# Patient Record
Sex: MALE | Race: OTHER | Employment: FULL TIME | URBAN - METROPOLITAN AREA
[De-identification: names, ages, dates, MRNs, and addresses within clinical notes are randomized per-mention and may not be internally consistent; named-entity substitution may affect disease eponyms.]

---

## 2017-02-13 DIAGNOSIS — Z00.00 ROUTINE HEALTH MAINTENANCE: ICD-10-CM

## 2017-02-13 DIAGNOSIS — A63.0 GENITAL WARTS: ICD-10-CM

## 2017-02-13 DIAGNOSIS — Z23 NEED FOR PROPHYLACTIC VACCINATION WITH COMBINED DIPHTHERIA-TETANUS-PERTUSSIS (DTP) VACCINE: Primary | ICD-10-CM

## 2017-02-13 RX ORDER — VARENICLINE TARTRATE 1 MG/1
1 TABLET, FILM COATED ORAL 2 TIMES DAILY
Qty: 60 TABLET | Refills: 6 | Status: SHIPPED | OUTPATIENT
Start: 2017-02-13 | End: 2017-05-04 | Stop reason: ALTCHOICE

## 2017-02-13 RX ORDER — IMIQUIMOD 12.5 MG/.25G
CREAM TOPICAL
Qty: 12 EACH | Refills: 0 | Status: SHIPPED | OUTPATIENT
Start: 2017-02-13 | End: 2018-10-29

## 2017-04-25 ENCOUNTER — OFFICE VISIT (OUTPATIENT)
Dept: FAMILY MEDICINE CLINIC | Facility: CLINIC | Age: 37
End: 2017-04-25

## 2017-04-25 VITALS
WEIGHT: 229 LBS | RESPIRATION RATE: 14 BRPM | SYSTOLIC BLOOD PRESSURE: 120 MMHG | HEART RATE: 84 BPM | HEIGHT: 70.5 IN | BODY MASS INDEX: 32.42 KG/M2 | TEMPERATURE: 97 F | DIASTOLIC BLOOD PRESSURE: 78 MMHG

## 2017-04-25 DIAGNOSIS — J30.89 SEASONAL ALLERGIC RHINITIS DUE TO OTHER ALLERGIC TRIGGER: Primary | ICD-10-CM

## 2017-04-25 DIAGNOSIS — R09.81 SINUS CONGESTION: ICD-10-CM

## 2017-04-25 PROCEDURE — 99213 OFFICE O/P EST LOW 20 MIN: CPT | Performed by: FAMILY MEDICINE

## 2017-04-25 NOTE — PROGRESS NOTES
HPI:   Jessica Watkins is a 30year old male who presents for upper respiratory symptoms for  1  days. Patient reports sore throat, congestion, clear colored nasal discharge.       Current Outpatient Prescriptions:  Imiquimod 5 % External Cream Apply to the rhinitis due to other allergic trigger  (primary encounter diagnosis)  Sinus congestion    Try some flonase and or Mucinex bid call if Sx worsen or persist beyond 1 week  Meds & Refills for this Visit:  No prescriptions requested or ordered in this encount

## 2017-04-27 ENCOUNTER — TELEPHONE (OUTPATIENT)
Dept: FAMILY MEDICINE CLINIC | Facility: CLINIC | Age: 37
End: 2017-04-27

## 2017-04-27 RX ORDER — GENTAMICIN SULFATE 3 MG/ML
SOLUTION/ DROPS OPHTHALMIC
Qty: 5 ML | Refills: 0 | Status: SHIPPED | OUTPATIENT
Start: 2017-04-27 | End: 2017-05-30

## 2017-04-27 NOTE — TELEPHONE ENCOUNTER
Has had sx today, he had it a couple of months ago. He was given tobraycin 0.3% gtts. . Very red eyeball, green drainage, itchy, some swelling. Actually spouse and child were seen in [facility], both dx with \"pink eye\" and treated.  She was told if Laurence developed

## 2017-05-01 ENCOUNTER — TELEPHONE (OUTPATIENT)
Dept: FAMILY MEDICINE CLINIC | Facility: CLINIC | Age: 37
End: 2017-05-01

## 2017-05-01 NOTE — TELEPHONE ENCOUNTER
Well its possible his heartburn might be causing his sore throat and URI issues.  Have hi get some OTC omeprazole 20 mg po bid

## 2017-05-04 ENCOUNTER — OFFICE VISIT (OUTPATIENT)
Dept: FAMILY MEDICINE CLINIC | Facility: CLINIC | Age: 37
End: 2017-05-04

## 2017-05-04 VITALS
DIASTOLIC BLOOD PRESSURE: 70 MMHG | TEMPERATURE: 98 F | BODY MASS INDEX: 34 KG/M2 | WEIGHT: 238 LBS | HEART RATE: 72 BPM | SYSTOLIC BLOOD PRESSURE: 128 MMHG

## 2017-05-04 DIAGNOSIS — J01.30 ACUTE NON-RECURRENT SPHENOIDAL SINUSITIS: Primary | ICD-10-CM

## 2017-05-04 DIAGNOSIS — J02.8 PHARYNGITIS DUE TO OTHER ORGANISM: ICD-10-CM

## 2017-05-04 PROCEDURE — 99213 OFFICE O/P EST LOW 20 MIN: CPT | Performed by: FAMILY MEDICINE

## 2017-05-04 RX ORDER — CEPHALEXIN 500 MG/1
500 CAPSULE ORAL 2 TIMES DAILY
Qty: 20 CAPSULE | Refills: 0 | Status: SHIPPED | OUTPATIENT
Start: 2017-05-04 | End: 2017-05-14

## 2017-05-04 NOTE — PROGRESS NOTES
HPI:   Ajay Storm is a 30year old male who presents for upper respiratory symptoms for  2  weeks. Patient reports sore throat, congestion, yellow colored nasal discharge, sinus pain.   Has been using flonase with some relief of congestion, but has some masses, HSM or tenderness    ASSESSMENT AND PLAN:   Acute non-recurrent sphenoidal sinusitis  (primary encounter diagnosis)  Pharyngitis due to other organism  CONTINUE THE FLONASE, CAN USE SOME  MUCINEX CALL IF sX PERSIST    Meds & Refills for this Visit:

## 2017-05-30 ENCOUNTER — OFFICE VISIT (OUTPATIENT)
Dept: FAMILY MEDICINE CLINIC | Facility: CLINIC | Age: 37
End: 2017-05-30

## 2017-05-30 VITALS
BODY MASS INDEX: 33 KG/M2 | DIASTOLIC BLOOD PRESSURE: 70 MMHG | TEMPERATURE: 98 F | HEART RATE: 88 BPM | WEIGHT: 235 LBS | SYSTOLIC BLOOD PRESSURE: 124 MMHG

## 2017-05-30 DIAGNOSIS — H10.9 CONJUNCTIVITIS OF BOTH EYES, UNSPECIFIED CONJUNCTIVITIS TYPE: Primary | ICD-10-CM

## 2017-05-30 PROCEDURE — 99213 OFFICE O/P EST LOW 20 MIN: CPT | Performed by: FAMILY MEDICINE

## 2017-05-30 RX ORDER — PREDNISOLONE ACETATE 10 MG/ML
2 SUSPENSION/ DROPS OPHTHALMIC 4 TIMES DAILY
Qty: 15 ML | Refills: 0 | Status: SHIPPED | OUTPATIENT
Start: 2017-05-30 | End: 2017-06-04

## 2017-05-30 NOTE — PROGRESS NOTES
Kiran Cheng is a 30year old male.   HPI:   Derrick Lee is here for follow up on persistent eye redness and some discharge noted over the past month, his wife and child both had conjunctivitis associated with a URI, they were given eye drops to use Tobramycin, the sclera is actually edematous   NECK: supple,no adenopathy,no bruits  LUNGS: clear to auscultation  CARDIO: RRR without murmur  GI: good BS's,no masses, HSM or tenderness  EXTREMITIES: no cyanosis, clubbing or edema    ASSESSMENT AND PLAN:   Conjunctivi

## 2017-06-02 ENCOUNTER — TELEPHONE (OUTPATIENT)
Dept: FAMILY MEDICINE CLINIC | Facility: CLINIC | Age: 37
End: 2017-06-02

## 2017-06-02 NOTE — TELEPHONE ENCOUNTER
Called and left detailed message to notify patient that MD is out of office we would send message, but until then to continue drops. Notified to call back with questions. To DS  When can patient stop drops?

## 2017-06-03 NOTE — TELEPHONE ENCOUNTER
Pt's wife call to let DS know that the eyes are not as red, and much more comfortable for pt. Wife wants to know how long to keep doing the eye drops. Wife would like a call back.  [phone]

## 2017-12-12 ENCOUNTER — OFFICE VISIT (OUTPATIENT)
Dept: FAMILY MEDICINE CLINIC | Facility: CLINIC | Age: 37
End: 2017-12-12

## 2017-12-12 VITALS
TEMPERATURE: 97 F | SYSTOLIC BLOOD PRESSURE: 120 MMHG | DIASTOLIC BLOOD PRESSURE: 80 MMHG | RESPIRATION RATE: 18 BRPM | HEIGHT: 70.5 IN | HEART RATE: 88 BPM | WEIGHT: 229.81 LBS | BODY MASS INDEX: 32.53 KG/M2

## 2017-12-12 DIAGNOSIS — Z00.00 ROUTINE HEALTH MAINTENANCE: Primary | ICD-10-CM

## 2017-12-12 PROCEDURE — 80050 GENERAL HEALTH PANEL: CPT | Performed by: FAMILY MEDICINE

## 2017-12-12 PROCEDURE — 99395 PREV VISIT EST AGE 18-39: CPT | Performed by: FAMILY MEDICINE

## 2017-12-12 PROCEDURE — 80061 LIPID PANEL: CPT | Performed by: FAMILY MEDICINE

## 2017-12-12 PROCEDURE — 36415 COLL VENOUS BLD VENIPUNCTURE: CPT | Performed by: FAMILY MEDICINE

## 2017-12-12 NOTE — PROGRESS NOTES
Moise Lazaro is a 30year old male who presents for a complete physical exam.   HPI:   Pt complains of has occasional GERD takes OTC omeprazole.  But not daily and  Does not wake him up at night, due for labs      Immunization History  Administered Occ: . : . Children: 1.5. Exercise: none.   Diet: watches calories closely     REVIEW OF SYSTEMS:   GENERAL: feels well otherwise  SKIN: denies any unusual skin lesions  EYES:denies blurred vision or double vision  HEENT: denies these issues and agrees to the plan. The patient is asked to return for CPX in 1 year.   Routine health maintenance  (primary encounter diagnosis)      Orders Placed This Encounter      CBC, Platelet, No Differential [E]      Comp Metabolic Panel (14) [E]

## 2017-12-13 ENCOUNTER — TELEPHONE (OUTPATIENT)
Dept: FAMILY MEDICINE CLINIC | Facility: CLINIC | Age: 37
End: 2017-12-13

## 2017-12-13 NOTE — TELEPHONE ENCOUNTER
----- Message from Sera Quezada DO sent at 12/13/2017  8:21 AM CST -----  Notify Grewal Player  labs looked very good lipids were  Excellent kidney, liver function, blood sugar and thyroid were all normal.  He's good for a year

## 2018-10-29 ENCOUNTER — NURSE ONLY (OUTPATIENT)
Dept: FAMILY MEDICINE CLINIC | Facility: CLINIC | Age: 38
End: 2018-10-29
Payer: COMMERCIAL

## 2018-10-29 ENCOUNTER — OFFICE VISIT (OUTPATIENT)
Dept: FAMILY MEDICINE CLINIC | Facility: CLINIC | Age: 38
End: 2018-10-29
Payer: COMMERCIAL

## 2018-10-29 ENCOUNTER — TELEPHONE (OUTPATIENT)
Dept: FAMILY MEDICINE CLINIC | Facility: CLINIC | Age: 38
End: 2018-10-29

## 2018-10-29 VITALS
TEMPERATURE: 99 F | RESPIRATION RATE: 16 BRPM | WEIGHT: 224.81 LBS | HEART RATE: 100 BPM | DIASTOLIC BLOOD PRESSURE: 80 MMHG | SYSTOLIC BLOOD PRESSURE: 110 MMHG | BODY MASS INDEX: 31.47 KG/M2 | HEIGHT: 71 IN

## 2018-10-29 DIAGNOSIS — Z00.00 ROUTINE HEALTH MAINTENANCE: Primary | ICD-10-CM

## 2018-10-29 DIAGNOSIS — Z00.00 ROUTINE HEALTH MAINTENANCE: ICD-10-CM

## 2018-10-29 PROCEDURE — 84439 ASSAY OF FREE THYROXINE: CPT | Performed by: FAMILY MEDICINE

## 2018-10-29 PROCEDURE — 80061 LIPID PANEL: CPT | Performed by: FAMILY MEDICINE

## 2018-10-29 PROCEDURE — 99395 PREV VISIT EST AGE 18-39: CPT | Performed by: FAMILY MEDICINE

## 2018-10-29 PROCEDURE — 36415 COLL VENOUS BLD VENIPUNCTURE: CPT | Performed by: FAMILY MEDICINE

## 2018-10-29 PROCEDURE — 80050 GENERAL HEALTH PANEL: CPT | Performed by: FAMILY MEDICINE

## 2018-10-29 NOTE — PROGRESS NOTES
1 mint and 1 lavender tube collected from L AC using straight needle and 1 attempt    Pt tolerated and was sent home in stable condition

## 2018-10-29 NOTE — TELEPHONE ENCOUNTER
Would like to come in around 3:30 for labs before 5:15 appt tonight so he can eat. Need for wellness visit and the same labs wife had. Pls call [facility].

## 2018-10-29 NOTE — TELEPHONE ENCOUNTER
Routing to provider for reivew-   Last Labs  Lipid, TSH, CMP and CBC 12/12/17  Last Lipid 11/2015    Please advise what orders RN can place and I will call pt back to schedule nurse visit for labs

## 2018-10-29 NOTE — PROGRESS NOTES
Maria Del Carmen Velez is a 41year old male who presents for a complete physical exam.   HPI:   Pt complains of nothing at this time. He has some occasional heartburn but for the most it has been pretty good.  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 Exercise: none.   Diet: doesn't watch     REVIEW OF SYSTEMS:   GENERAL: feels well other times weekly. Health maintenance, will check fasting Lipids, CMP, CBC and TSH. Pt info handouts given for: exercise, low fat diet, testicular self exam and prostate cancer screening.  The patient indicates understanding of these issues and agrees to the pl

## 2018-10-30 ENCOUNTER — TELEPHONE (OUTPATIENT)
Dept: FAMILY MEDICINE CLINIC | Facility: CLINIC | Age: 38
End: 2018-10-30

## 2018-10-30 NOTE — TELEPHONE ENCOUNTER
----- Message from Lisa Garcia DO sent at 10/30/2018  5:26 AM CDT -----  Can notify Hernan Lloyds his labs look very good, BS, kidney liver, thyroid, and blood count all look good.  Just need to work on his weight , maybe lose about 10 pounds and he’s good, let’s

## 2019-09-17 ENCOUNTER — TELEPHONE (OUTPATIENT)
Dept: FAMILY MEDICINE CLINIC | Facility: CLINIC | Age: 39
End: 2019-09-17

## 2019-09-17 DIAGNOSIS — Z00.00 ROUTINE HEALTH MAINTENANCE: Primary | ICD-10-CM

## 2019-09-17 NOTE — TELEPHONE ENCOUNTER
Does not have order, wife also calling about herself she is a patient of Dr. Binu Jean.  Patient currently has an apt set up for 10/12

## 2019-09-17 NOTE — TELEPHONE ENCOUNTER
Patient and wife need to have lab work for their health insurance. They need to have a [id number]; Cholesterol Lipid panel and either a [place] [address] [id number] Comp/Metabolic.

## 2019-10-12 ENCOUNTER — NURSE ONLY (OUTPATIENT)
Dept: FAMILY MEDICINE CLINIC | Facility: CLINIC | Age: 39
End: 2019-10-12
Payer: COMMERCIAL

## 2019-10-12 DIAGNOSIS — Z00.00 ROUTINE HEALTH MAINTENANCE: ICD-10-CM

## 2019-10-12 PROCEDURE — 85025 COMPLETE CBC W/AUTO DIFF WBC: CPT | Performed by: FAMILY MEDICINE

## 2019-10-12 PROCEDURE — 36415 COLL VENOUS BLD VENIPUNCTURE: CPT | Performed by: FAMILY MEDICINE

## 2019-10-12 PROCEDURE — 80053 COMPREHEN METABOLIC PANEL: CPT | Performed by: FAMILY MEDICINE

## 2019-10-12 PROCEDURE — 80061 LIPID PANEL: CPT | Performed by: FAMILY MEDICINE

## 2019-10-12 NOTE — PROGRESS NOTES
Patient presented for lab work ordered by Jose Hughes. One mint tube and one lavender tube drawn with a straight needle in right arm X 1 attempt. PT left form for work px to be filled out by Dr. Mateusz Gandara. Pt tolerated procedure well.

## 2019-10-14 ENCOUNTER — TELEPHONE (OUTPATIENT)
Dept: FAMILY MEDICINE CLINIC | Facility: CLINIC | Age: 39
End: 2019-10-14

## 2019-11-06 ENCOUNTER — OFFICE VISIT (OUTPATIENT)
Dept: FAMILY MEDICINE CLINIC | Facility: CLINIC | Age: 39
End: 2019-11-06
Payer: COMMERCIAL

## 2019-11-06 VITALS
TEMPERATURE: 99 F | RESPIRATION RATE: 18 BRPM | BODY MASS INDEX: 30.75 KG/M2 | HEIGHT: 71 IN | DIASTOLIC BLOOD PRESSURE: 70 MMHG | HEART RATE: 84 BPM | SYSTOLIC BLOOD PRESSURE: 112 MMHG | WEIGHT: 219.63 LBS

## 2019-11-06 DIAGNOSIS — Z00.00 ROUTINE HEALTH MAINTENANCE: Primary | ICD-10-CM

## 2019-11-06 PROCEDURE — 99395 PREV VISIT EST AGE 18-39: CPT | Performed by: FAMILY MEDICINE

## 2019-11-06 NOTE — PROGRESS NOTES
Paulo Payne is a 42year old male who presents for a complete physical exam.   HPI:   Pt complains of nothing at this time  No recent surgery no new Rx meds or OTC meds, in general he feels well. No family HX, of anything significant.  His brother was [facility] 1.00        Years: 18.00        Pack years: 25        Quit date: 3/13/2017        Years since quittin.6      Smokeless tobacco: Never Used    Alcohol use:  Yes      Alcohol/week: 0.0 standard drinks      Frequency: 2-3 times a week    Drug use: No     O Pt's weight is Body mass index is 30.63 kg/m². , recommended low fat diet and aerobic exercise 30 minutes three times weekly.  Health maintenance, will reveiwed fasting Lipids, CMP, CBC  info handouts given for: exercise, low fat diet, testicular self exam T

## 2019-12-30 ENCOUNTER — OFFICE VISIT (OUTPATIENT)
Dept: FAMILY MEDICINE CLINIC | Facility: CLINIC | Age: 39
End: 2019-12-30
Payer: COMMERCIAL

## 2019-12-30 ENCOUNTER — TELEPHONE (OUTPATIENT)
Dept: FAMILY MEDICINE CLINIC | Facility: CLINIC | Age: 39
End: 2019-12-30

## 2019-12-30 VITALS
BODY MASS INDEX: 30 KG/M2 | RESPIRATION RATE: 20 BRPM | SYSTOLIC BLOOD PRESSURE: 112 MMHG | TEMPERATURE: 97 F | DIASTOLIC BLOOD PRESSURE: 68 MMHG | WEIGHT: 215.63 LBS | HEART RATE: 72 BPM

## 2019-12-30 DIAGNOSIS — L73.8 FOLLICULITIS BARBAE: Primary | ICD-10-CM

## 2019-12-30 PROCEDURE — 99214 OFFICE O/P EST MOD 30 MIN: CPT | Performed by: FAMILY MEDICINE

## 2019-12-30 RX ORDER — METHYLPREDNISOLONE 4 MG/1
TABLET ORAL
Qty: 1 KIT | Refills: 0 | Status: SHIPPED | OUTPATIENT
Start: 2019-12-30 | End: 2019-12-30

## 2019-12-30 RX ORDER — METHYLPREDNISOLONE 4 MG/1
TABLET ORAL
Qty: 1 KIT | Refills: 0 | Status: SHIPPED | OUTPATIENT
Start: 2019-12-30 | End: 2020-10-10 | Stop reason: ALTCHOICE

## 2019-12-30 NOTE — PROGRESS NOTES
Prince Hastings is a 42year old male. HPI:   Reji Douglass developed a rash on his hands and feet after his son had HFM disease, he noted red blotches on his fingers and the palms and the bottoms of  his feet, he also noted a few on the back of his hands.  He also tenderness  EXTREMITIES: no cyanosis, clubbing or edema    ASSESSMENT AND PLAN:     Folliculitis barbae  (primary encounter diagnosis)    reviewed course and etiology of  HFM and should eb fine martin a week was papo hand out,  continue the T gel for now reg

## 2019-12-30 NOTE — TELEPHONE ENCOUNTER
Per DS can see pt today at 3:30    LM for wife that DS can see him at 330- pt is on schedule.   Wife was advised to call back if that does not work    Future Appointments   Date Time Provider Cindy Riggins   12/30/2019  3:30 PM Coleen Young, DO Cindy Enciso

## 2019-12-30 NOTE — TELEPHONE ENCOUNTER
Wife called kids had HFM. Now Pt has sores on top of hands, head/hair, and on feet( cant put his boots on and walking on toes). They are wondering if Pt now has HFM. Wants to know if they could get in today or if they should go to walk in care?  Please advi

## 2020-03-16 PROBLEM — Z30.2 ENCOUNTER FOR VASECTOMY: Status: ACTIVE | Noted: 2020-03-16

## 2020-10-10 ENCOUNTER — OFFICE VISIT (OUTPATIENT)
Dept: FAMILY MEDICINE CLINIC | Facility: CLINIC | Age: 40
End: 2020-10-10
Payer: COMMERCIAL

## 2020-10-10 VITALS
SYSTOLIC BLOOD PRESSURE: 110 MMHG | HEART RATE: 72 BPM | TEMPERATURE: 98 F | HEIGHT: 71 IN | RESPIRATION RATE: 16 BRPM | DIASTOLIC BLOOD PRESSURE: 70 MMHG | WEIGHT: 205.19 LBS | BODY MASS INDEX: 28.73 KG/M2

## 2020-10-10 DIAGNOSIS — Z00.00 ROUTINE HEALTH MAINTENANCE: Primary | ICD-10-CM

## 2020-10-10 PROCEDURE — 3074F SYST BP LT 130 MM HG: CPT | Performed by: FAMILY MEDICINE

## 2020-10-10 PROCEDURE — 80050 GENERAL HEALTH PANEL: CPT | Performed by: FAMILY MEDICINE

## 2020-10-10 PROCEDURE — 3008F BODY MASS INDEX DOCD: CPT | Performed by: FAMILY MEDICINE

## 2020-10-10 PROCEDURE — 36415 COLL VENOUS BLD VENIPUNCTURE: CPT | Performed by: FAMILY MEDICINE

## 2020-10-10 PROCEDURE — 80061 LIPID PANEL: CPT | Performed by: FAMILY MEDICINE

## 2020-10-10 PROCEDURE — 3078F DIAST BP <80 MM HG: CPT | Performed by: FAMILY MEDICINE

## 2020-10-10 PROCEDURE — 99395 PREV VISIT EST AGE 18-39: CPT | Performed by: FAMILY MEDICINE

## 2020-10-10 NOTE — PROGRESS NOTES
Raymond Gonzalez is a 47year old male who presents for a complete physical exam.   HPI:   Pt complains of nothing at this time he, he has lost about 20 pounds intentionally, he feels great, has not had any surgery since he was last here, no new RX meds ,, t Smoker        Packs/day: 1.00        Years: 18.00        Pack years: 25        Quit date: 3/13/2017        Years since quitting: 3.5      Smokeless tobacco: Never Used    Alcohol use:  Yes      Alcohol/week: 0.0 standard drinks      Frequency: 2-3 times a w sensory are grossly intact    ASSESSMENT AND PLAN:   Roland Carter is a 47year old male who presents for a complete physical exam.    Pt's weight is Body mass index is 28.62 kg/m². , recommended low fat diet and aerobic exercise 30 minutes three times wee

## 2020-10-12 ENCOUNTER — TELEPHONE (OUTPATIENT)
Dept: FAMILY MEDICINE CLINIC | Facility: CLINIC | Age: 40
End: 2020-10-12

## 2020-10-12 NOTE — TELEPHONE ENCOUNTER
----- Message from Levi Marcelino DO sent at 10/12/2020  7:55 AM CDT -----  Notify Maria Del Carmen Velez  labs looked very good lipids were  Excellent kidney, liver function, blood sugar and thyroid were all normal.  He's good for a year

## 2021-03-16 ENCOUNTER — TELEPHONE (OUTPATIENT)
Dept: FAMILY MEDICINE CLINIC | Facility: CLINIC | Age: 41
End: 2021-03-16

## 2021-03-16 ENCOUNTER — LAB ENCOUNTER (OUTPATIENT)
Dept: LAB | Facility: HOSPITAL | Age: 41
End: 2021-03-16
Attending: FAMILY MEDICINE
Payer: COMMERCIAL

## 2021-03-16 DIAGNOSIS — R50.81 FEVER IN OTHER DISEASES: Primary | ICD-10-CM

## 2021-03-16 DIAGNOSIS — Z20.822 CLOSE EXPOSURE TO COVID-19 VIRUS: ICD-10-CM

## 2021-03-16 DIAGNOSIS — R50.81 FEVER IN OTHER DISEASES: ICD-10-CM

## 2021-03-16 NOTE — TELEPHONE ENCOUNTER
[facility] has been having sx, headache, body aches, vomiting x 1, no fever, both of the sons c/o abdominal sx. Dr. Jacky Saldana ordered covid test for those 3. Alex Sarabia wonders if Michela Zeferino should be tested also, he has started to develop, head/sinus sx.

## 2021-03-17 ENCOUNTER — TELEPHONE (OUTPATIENT)
Dept: FAMILY MEDICINE CLINIC | Facility: CLINIC | Age: 41
End: 2021-03-17

## 2021-03-17 LAB — SARS-COV-2 RNA RESP QL NAA+PROBE: NOT DETECTED

## 2021-03-17 NOTE — TELEPHONE ENCOUNTER
----- Message from Kami Phillips DO sent at 3/17/2021  4:10 PM CDT -----  Can notify Beny Hughes his COVID test was negative

## 2021-04-28 NOTE — TELEPHONE ENCOUNTER
----- Message from Donald Simon DO sent at 10/14/2019 12:51 PM CDT -----  Notify Tremaine Pretty  labs looked very good lipids were  Excellent kidney, liver function, blood sugar and thyroid were all normal.  We can discuss further at his Physical, if need
Mary Abad (consent in Media Tab) notified of results. Also informed that form for work has been faxed.
18

## 2021-05-02 ENCOUNTER — IMMUNIZATION (OUTPATIENT)
Dept: LAB | Age: 41
End: 2021-05-02
Attending: HOSPITALIST
Payer: COMMERCIAL

## 2021-05-02 DIAGNOSIS — Z23 NEED FOR VACCINATION: Primary | ICD-10-CM

## 2021-05-02 PROCEDURE — 0001A SARSCOV2 VAC 30MCG/0.3ML IM: CPT

## 2021-05-03 ENCOUNTER — HOSPITAL ENCOUNTER (OUTPATIENT)
Age: 41
Discharge: HOME | End: 2021-05-03
Payer: COMMERCIAL

## 2021-05-03 ENCOUNTER — TELEPHONE (OUTPATIENT)
Dept: FAMILY MEDICINE CLINIC | Facility: CLINIC | Age: 41
End: 2021-05-03

## 2021-05-03 ENCOUNTER — APPOINTMENT (OUTPATIENT)
Dept: GENERAL RADIOLOGY | Age: 41
End: 2021-05-03
Attending: PHYSICIAN ASSISTANT
Payer: COMMERCIAL

## 2021-05-03 VITALS
HEART RATE: 82 BPM | WEIGHT: 206 LBS | RESPIRATION RATE: 18 BRPM | SYSTOLIC BLOOD PRESSURE: 128 MMHG | TEMPERATURE: 98 F | DIASTOLIC BLOOD PRESSURE: 81 MMHG | OXYGEN SATURATION: 97 % | BODY MASS INDEX: 27.9 KG/M2 | HEIGHT: 72 IN

## 2021-05-03 DIAGNOSIS — Z72.0 TOBACCO ABUSE: ICD-10-CM

## 2021-05-03 DIAGNOSIS — R05.9 COUGH: Primary | ICD-10-CM

## 2021-05-03 DIAGNOSIS — R05.3 CHRONIC COUGH: Primary | ICD-10-CM

## 2021-05-03 PROCEDURE — 99202 OFFICE O/P NEW SF 15 MIN: CPT | Performed by: PHYSICIAN ASSISTANT

## 2021-05-03 PROCEDURE — 71046 X-RAY EXAM CHEST 2 VIEWS: CPT | Performed by: PHYSICIAN ASSISTANT

## 2021-05-03 NOTE — TELEPHONE ENCOUNTER
I'm betting he has reflux and is refluxing overnight, or he might be having some bronchospasm, like an asthma thing and not COVID or cancer.  I can order a CXR he can get done after work, at the [address] [place] [address] and then set up an appt the next day to go byron the results

## 2021-05-03 NOTE — TELEPHONE ENCOUNTER
Pt scheduled visit in office via [address] include coughing and SOB    RN to call and triage- may need VV

## 2021-05-03 NOTE — ED PROVIDER NOTES
Patient Seen in: [facility]      History   Patient presents with:  Shortness Of Breath    Stated Complaint: wheezing and coughing at night    HPI/Subjective:   HPI    30-year-old male with a smoking history presents to the IC for evaluation of External ear normal.      Left Ear: External ear normal.   Eyes:      Conjunctiva/sclera: Conjunctivae normal.   Cardiovascular:      Rate and Rhythm: Normal rate and regular rhythm.    Pulmonary:      Effort: Pulmonary effort is normal.      Breath sounds: understanding and agreement to discharge plan.                              Disposition and Plan     Clinical Impression:  Chronic cough  (primary encounter diagnosis)     Disposition:  Discharge  5/3/2021  4:40 pm    Follow-up:  Juani Crain DO  [facility]

## 2021-05-03 NOTE — TELEPHONE ENCOUNTER
Wife states pt is smoker and this cough has been going on mostly at night. He wakes up and feels like there if fluid in his chest and he can't breath. Wife states this has been going on for about the past 2 months.     Wife states pt has been having s

## 2021-05-03 NOTE — ED INITIAL ASSESSMENT (HPI)
Pt c/o sob and wheezing intermittently over the past couple of months,only at nighttime while laying down. Pt reports he also has a cough with clear mucous at nighttime only. Pt denies any SOB, CP, fever, chills, or any symptoms at this time.  Pt is a smoke

## 2021-05-03 NOTE — TELEPHONE ENCOUNTER
Wife was advised of o rder in system and was given number to CS to call and schedule CXR    Will call back to make f/u visit    Visit on 5/3/21 cancelled

## 2021-05-06 ENCOUNTER — OFFICE VISIT (OUTPATIENT)
Dept: FAMILY MEDICINE CLINIC | Facility: CLINIC | Age: 41
End: 2021-05-06
Payer: COMMERCIAL

## 2021-05-06 VITALS
OXYGEN SATURATION: 98 % | TEMPERATURE: 98 F | HEART RATE: 73 BPM | WEIGHT: 212.81 LBS | SYSTOLIC BLOOD PRESSURE: 112 MMHG | DIASTOLIC BLOOD PRESSURE: 70 MMHG | BODY MASS INDEX: 29 KG/M2 | RESPIRATION RATE: 20 BRPM

## 2021-05-06 DIAGNOSIS — R05.9 COUGH: Primary | ICD-10-CM

## 2021-05-06 DIAGNOSIS — Z72.0 TOBACCO ABUSE: ICD-10-CM

## 2021-05-06 DIAGNOSIS — J98.01 BRONCHOSPASM: ICD-10-CM

## 2021-05-06 DIAGNOSIS — K21.00 GASTROESOPHAGEAL REFLUX DISEASE WITH ESOPHAGITIS WITHOUT HEMORRHAGE: ICD-10-CM

## 2021-05-06 PROCEDURE — 3078F DIAST BP <80 MM HG: CPT | Performed by: FAMILY MEDICINE

## 2021-05-06 PROCEDURE — 3074F SYST BP LT 130 MM HG: CPT | Performed by: FAMILY MEDICINE

## 2021-05-06 PROCEDURE — 99214 OFFICE O/P EST MOD 30 MIN: CPT | Performed by: FAMILY MEDICINE

## 2021-05-06 RX ORDER — NICOTINE POLACRILEX 4 MG/1
20 GUM, CHEWING ORAL NIGHTLY
Qty: 30 TABLET | Refills: 2 | Status: SHIPPED | OUTPATIENT
Start: 2021-05-06 | End: 2021-05-27

## 2021-05-06 RX ORDER — METHYLPREDNISOLONE 4 MG/1
TABLET ORAL
Qty: 1 KIT | Refills: 0 | Status: SHIPPED | OUTPATIENT
Start: 2021-05-06 | End: 2021-05-27 | Stop reason: ALTCHOICE

## 2021-05-06 NOTE — PROGRESS NOTES
Sonam Anna is a 46year old male. HPI:   Pennie Alves is here for follow up on cough, he is a smoker, and he does get GERD at night, he wakes himself up at times, and coughs and wheezes, non productive.  He is afebrile, if he coughs anything up it is clear, he murmur  GI: good BS's,no masses, HSM or tenderness  EXTREMITIES: no cyanosis, clubbing or edema    ASSESSMENT AND PLAN:     Cough  (primary encounter diagnosis)  Tobacco abuse  Gastroesophageal reflux disease with esophagitis without hemorrhage  Bronchospa

## 2021-05-23 ENCOUNTER — IMMUNIZATION (OUTPATIENT)
Dept: LAB | Age: 41
End: 2021-05-23
Attending: HOSPITALIST
Payer: COMMERCIAL

## 2021-05-23 DIAGNOSIS — Z23 NEED FOR VACCINATION: Primary | ICD-10-CM

## 2021-05-23 PROCEDURE — 0002A SARSCOV2 VAC 30MCG/0.3ML IM: CPT

## 2021-05-27 ENCOUNTER — OFFICE VISIT (OUTPATIENT)
Dept: FAMILY MEDICINE CLINIC | Facility: CLINIC | Age: 41
End: 2021-05-27
Payer: COMMERCIAL

## 2021-05-27 ENCOUNTER — OFFICE VISIT (OUTPATIENT)
Dept: FAMILY MEDICINE CLINIC | Facility: CLINIC | Age: 41
End: 2021-05-27

## 2021-05-27 VITALS
BODY MASS INDEX: 28.31 KG/M2 | DIASTOLIC BLOOD PRESSURE: 72 MMHG | HEART RATE: 72 BPM | WEIGHT: 209 LBS | HEIGHT: 72 IN | SYSTOLIC BLOOD PRESSURE: 110 MMHG | RESPIRATION RATE: 16 BRPM

## 2021-05-27 VITALS
RESPIRATION RATE: 20 BRPM | WEIGHT: 209.63 LBS | TEMPERATURE: 98 F | HEIGHT: 72 IN | HEART RATE: 72 BPM | BODY MASS INDEX: 28.39 KG/M2 | DIASTOLIC BLOOD PRESSURE: 72 MMHG | SYSTOLIC BLOOD PRESSURE: 110 MMHG

## 2021-05-27 DIAGNOSIS — Z02.4 DRIVER'S PERMIT PE (PHYSICAL EXAMINATION): Primary | ICD-10-CM

## 2021-05-27 DIAGNOSIS — K21.00 GASTROESOPHAGEAL REFLUX DISEASE WITH ESOPHAGITIS WITHOUT HEMORRHAGE: Primary | ICD-10-CM

## 2021-05-27 DIAGNOSIS — Z72.0 TOBACCO ABUSE: ICD-10-CM

## 2021-05-27 DIAGNOSIS — R06.00 DYSPNEA ON EXERTION: ICD-10-CM

## 2021-05-27 PROCEDURE — 81003 URINALYSIS AUTO W/O SCOPE: CPT | Performed by: FAMILY MEDICINE

## 2021-05-27 PROCEDURE — 99214 OFFICE O/P EST MOD 30 MIN: CPT | Performed by: FAMILY MEDICINE

## 2021-05-27 RX ORDER — NICOTINE POLACRILEX 4 MG/1
40 GUM, CHEWING ORAL NIGHTLY
Qty: 30 TABLET | Refills: 2 | COMMUNITY
Start: 2021-05-27

## 2021-05-27 NOTE — PROGRESS NOTES
Marcello Hester is a 46year old male. HPI:   Bobbi Claros is here for follow up on cough, he is a smoker, and he does get GERD at night, he wakes himself up at times, and coughs and wheezes, non productive.  He is afebrile, if he coughs anything up it is clear, he his throat, and coughing  NEURO: denies headaches    EXAM:   /72 (BP Location: Left arm, Patient Position: Sitting, Cuff Size: large)   Pulse 72   Temp 97.9 °F (36.6 °C) (Temporal)   Resp 20   Ht 6' (1.829 m)   Wt 209 lb 9.6 oz (95.1 kg)   BMI 28.43

## 2021-05-27 NOTE — PROGRESS NOTES
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    NEURO: Alert and oriented to person place and time  GENITAL EXA

## 2021-12-14 ENCOUNTER — OFFICE VISIT (OUTPATIENT)
Dept: FAMILY MEDICINE CLINIC | Facility: CLINIC | Age: 41
End: 2021-12-14
Payer: COMMERCIAL

## 2021-12-14 VITALS
DIASTOLIC BLOOD PRESSURE: 80 MMHG | HEIGHT: 72 IN | BODY MASS INDEX: 30.45 KG/M2 | SYSTOLIC BLOOD PRESSURE: 120 MMHG | OXYGEN SATURATION: 98 % | HEART RATE: 83 BPM | WEIGHT: 224.81 LBS | TEMPERATURE: 97 F

## 2021-12-14 DIAGNOSIS — Z00.00 ROUTINE HEALTH MAINTENANCE: Primary | ICD-10-CM

## 2021-12-14 DIAGNOSIS — A63.0 GENITAL WARTS: ICD-10-CM

## 2021-12-14 PROCEDURE — 80061 LIPID PANEL: CPT | Performed by: FAMILY MEDICINE

## 2021-12-14 PROCEDURE — 3008F BODY MASS INDEX DOCD: CPT | Performed by: FAMILY MEDICINE

## 2021-12-14 PROCEDURE — 80050 GENERAL HEALTH PANEL: CPT | Performed by: FAMILY MEDICINE

## 2021-12-14 PROCEDURE — 3074F SYST BP LT 130 MM HG: CPT | Performed by: FAMILY MEDICINE

## 2021-12-14 PROCEDURE — 99395 PREV VISIT EST AGE 18-39: CPT | Performed by: FAMILY MEDICINE

## 2021-12-14 PROCEDURE — 3079F DIAST BP 80-89 MM HG: CPT | Performed by: FAMILY MEDICINE

## 2021-12-14 NOTE — PROGRESS NOTES
Moise Lazaro is a 46year old male who presents for a complete physical exam.   HPI:   Pt complains of having issues with genital warts and not treating them completely he was given a topical antiviral and did not use it necessarily as directed.  He has a Diagnosis Date   • Tobacco abuse       Past Surgical History:   Procedure Laterality Date   • APPENDECTOMY     • APPENDECTOMY     • VASECTOMY  03/12/2020    Dr. Prieto Godoy      Family History   Problem Relation Age of Onset   • Other (Other) Brother         d BS's,no masses, HSM or tenderness  : two descended testes,no masses,no hernia, has a number of warts noted  The largest just above his penis  MUSCULOSKELETAL: back is not tender,FROM of the back  EXTREMITIES: no cyanosis, clubbing or edema  NEURO: [place]

## 2021-12-17 ENCOUNTER — TELEPHONE (OUTPATIENT)
Dept: FAMILY MEDICINE CLINIC | Facility: CLINIC | Age: 41
End: 2021-12-17

## 2021-12-17 NOTE — TELEPHONE ENCOUNTER
----- Message from Ani Enriquez DO sent at 12/17/2021 12:08 PM CST -----  Notify Cy Dopp  labs looked very good lipids were  Excellent,   kidney, liver function, and thyroid were all normal. His BS was slightly elevated but if I remember right this

## 2022-01-04 ENCOUNTER — TELEPHONE (OUTPATIENT)
Dept: FAMILY MEDICINE CLINIC | Facility: CLINIC | Age: 42
End: 2022-01-04

## 2022-01-04 NOTE — TELEPHONE ENCOUNTER
Pt's spouse called he is covid positive and has a very sore throat. He has been taking Tylenol and Mucinex sore throat/pain reliever . Spouse is wanting to know if he could get a secondary infections and if he should be on a different medication?     If christopher

## 2022-01-04 NOTE — TELEPHONE ENCOUNTER
Wife states pt tested positive on Sunday    Pt major complaint now is the Sore throat- wife states he has been trying to manage with tylenol and throat spray    Pt has lots of PND    Wondering if he need a different medication to manage ST?   Should he get

## 2022-01-04 NOTE — TELEPHONE ENCOUNTER
So It's more than likely not strep, it's a very common presentation with covid. He can take ADVIL 600 mg every eight and it should resolve in another day or so.  Unless there is pus back there, but I'd be surpised

## 2022-09-13 ENCOUNTER — TELEPHONE (OUTPATIENT)
Dept: FAMILY MEDICINE CLINIC | Facility: CLINIC | Age: 42
End: 2022-09-13

## 2022-09-13 NOTE — TELEPHONE ENCOUNTER
FYI:    Future Appointments   Date Time Provider Cindy Riggins   10/15/2022  8:30 AM Lety Garcias, DO [facility]       PER SPOUSE WOULD LIKE TO MAKE SURE THAT DR DOES RECTAL EXAM - PT HAS HAD A SOME BLOOD IN THE STOOL.       Mickey Fenton

## 2023-01-11 ENCOUNTER — TELEPHONE (OUTPATIENT)
Dept: FAMILY MEDICINE CLINIC | Facility: CLINIC | Age: 43
End: 2023-01-11

## 2023-01-11 RX ORDER — AMOXICILLIN AND CLAVULANATE POTASSIUM 875; 125 MG/1; MG/1
1 TABLET, FILM COATED ORAL 2 TIMES DAILY
Qty: 20 TABLET | Refills: 0 | Status: SHIPPED | OUTPATIENT
Start: 2023-01-11 | End: 2023-01-21

## 2023-01-11 NOTE — TELEPHONE ENCOUNTER
[id number] - [place] - [address], [phone]   [address] [place] [address]   Phone: [phone] Fax: [phone]   Hours: Not open 24 hours     PATIENT SPOUSE CALLING. SHE SAYS PATIENT HAS SINUSITIS. SPOUSE AND KIDS HAD THE SAME SYMPTOMS AND WENT TO [facility] AND WERE GIVEN ANTIBIOTIC. SPOUSE ASKING IF DR FRAZIER CAN CALL IN AN ANTIBIOTIC FOR PATIENT.

## 2023-01-11 NOTE — TELEPHONE ENCOUNTER
Spoke with patient, sinus congestion/teeth pressure x 3 days. Had the same symptoms around [place]. Would like abx sent in to pharmacy.     Please advise thank you

## 2023-09-16 ENCOUNTER — OFFICE VISIT (OUTPATIENT)
Dept: FAMILY MEDICINE CLINIC | Facility: CLINIC | Age: 43
End: 2023-09-16
Payer: COMMERCIAL

## 2023-09-16 VITALS
SYSTOLIC BLOOD PRESSURE: 116 MMHG | HEART RATE: 74 BPM | OXYGEN SATURATION: 97 % | HEIGHT: 72 IN | DIASTOLIC BLOOD PRESSURE: 74 MMHG | BODY MASS INDEX: 31.69 KG/M2 | TEMPERATURE: 98 F | WEIGHT: 234 LBS

## 2023-09-16 DIAGNOSIS — Z00.00 ROUTINE HEALTH MAINTENANCE: Primary | ICD-10-CM

## 2023-09-16 LAB
ALBUMIN SERPL-MCNC: 3.8 G/DL (ref 3.4–5)
ALBUMIN/GLOB SERPL: 1 {RATIO} (ref 1–2)
ALP LIVER SERPL-CCNC: 73 U/L
ALT SERPL-CCNC: 42 U/L
ANION GAP SERPL CALC-SCNC: 3 MMOL/L (ref 0–18)
AST SERPL-CCNC: 19 U/L (ref 15–37)
BASOPHILS # BLD AUTO: 0.04 X10(3) UL (ref 0–0.2)
BASOPHILS NFR BLD AUTO: 0.8 %
BILIRUB SERPL-MCNC: 0.5 MG/DL (ref 0.1–2)
BUN BLD-MCNC: 16 MG/DL (ref 7–18)
CALCIUM BLD-MCNC: 9.2 MG/DL (ref 8.5–10.1)
CHLORIDE SERPL-SCNC: 108 MMOL/L (ref 98–112)
CHOLEST SERPL-MCNC: 150 MG/DL (ref ?–200)
CO2 SERPL-SCNC: 27 MMOL/L (ref 21–32)
CREAT BLD-MCNC: 1.16 MG/DL
EGFRCR SERPLBLD CKD-EPI 2021: 81 ML/MIN/1.73M2 (ref 60–?)
EOSINOPHIL # BLD AUTO: 0.08 X10(3) UL (ref 0–0.7)
EOSINOPHIL NFR BLD AUTO: 1.6 %
ERYTHROCYTE [DISTWIDTH] IN BLOOD BY AUTOMATED COUNT: 12.4 %
FASTING PATIENT LIPID ANSWER: YES
FASTING STATUS PATIENT QL REPORTED: YES
GLOBULIN PLAS-MCNC: 3.7 G/DL (ref 2.8–4.4)
GLUCOSE BLD-MCNC: 96 MG/DL (ref 70–99)
HCT VFR BLD AUTO: 45.6 %
HDLC SERPL-MCNC: 51 MG/DL (ref 40–59)
HGB BLD-MCNC: 15.7 G/DL
IMM GRANULOCYTES # BLD AUTO: 0.01 X10(3) UL (ref 0–1)
IMM GRANULOCYTES NFR BLD: 0.2 %
LDLC SERPL CALC-MCNC: 89 MG/DL (ref ?–100)
LYMPHOCYTES # BLD AUTO: 1.2 X10(3) UL (ref 1–4)
LYMPHOCYTES NFR BLD AUTO: 24.4 %
MCH RBC QN AUTO: 31.3 PG (ref 26–34)
MCHC RBC AUTO-ENTMCNC: 34.4 G/DL (ref 31–37)
MCV RBC AUTO: 90.8 FL
MONOCYTES # BLD AUTO: 0.61 X10(3) UL (ref 0.1–1)
MONOCYTES NFR BLD AUTO: 12.4 %
NEUTROPHILS # BLD AUTO: 2.97 X10 (3) UL (ref 1.5–7.7)
NEUTROPHILS # BLD AUTO: 2.97 X10(3) UL (ref 1.5–7.7)
NEUTROPHILS NFR BLD AUTO: 60.6 %
NONHDLC SERPL-MCNC: 99 MG/DL (ref ?–130)
OSMOLALITY SERPL CALC.SUM OF ELEC: 287 MOSM/KG (ref 275–295)
PLATELET # BLD AUTO: 237 10(3)UL (ref 150–450)
POTASSIUM SERPL-SCNC: 4.6 MMOL/L (ref 3.5–5.1)
PROT SERPL-MCNC: 7.5 G/DL (ref 6.4–8.2)
RBC # BLD AUTO: 5.02 X10(6)UL
SODIUM SERPL-SCNC: 138 MMOL/L (ref 136–145)
T4 FREE SERPL-MCNC: 0.9 NG/DL (ref 0.8–1.7)
TRIGL SERPL-MCNC: 48 MG/DL (ref 30–149)
TSI SER-ACNC: 1.67 MIU/ML (ref 0.36–3.74)
VLDLC SERPL CALC-MCNC: 8 MG/DL (ref 0–30)
WBC # BLD AUTO: 4.9 X10(3) UL (ref 4–11)

## 2023-09-16 PROCEDURE — 84439 ASSAY OF FREE THYROXINE: CPT | Performed by: FAMILY MEDICINE

## 2023-09-16 PROCEDURE — 3074F SYST BP LT 130 MM HG: CPT | Performed by: FAMILY MEDICINE

## 2023-09-16 PROCEDURE — 85025 COMPLETE CBC W/AUTO DIFF WBC: CPT | Performed by: FAMILY MEDICINE

## 2023-09-16 PROCEDURE — 80061 LIPID PANEL: CPT | Performed by: FAMILY MEDICINE

## 2023-09-16 PROCEDURE — 84443 ASSAY THYROID STIM HORMONE: CPT | Performed by: FAMILY MEDICINE

## 2023-09-16 PROCEDURE — 99396 PREV VISIT EST AGE 40-64: CPT | Performed by: FAMILY MEDICINE

## 2023-09-16 PROCEDURE — 3008F BODY MASS INDEX DOCD: CPT | Performed by: FAMILY MEDICINE

## 2023-09-16 PROCEDURE — 3078F DIAST BP <80 MM HG: CPT | Performed by: FAMILY MEDICINE

## 2023-09-16 PROCEDURE — 80053 COMPREHEN METABOLIC PANEL: CPT | Performed by: FAMILY MEDICINE

## 2023-09-18 ENCOUNTER — TELEPHONE (OUTPATIENT)
Dept: FAMILY MEDICINE CLINIC | Facility: CLINIC | Age: 43
End: 2023-09-18

## 2024-01-08 ENCOUNTER — OFFICE VISIT (OUTPATIENT)
Dept: FAMILY MEDICINE CLINIC | Facility: CLINIC | Age: 44
End: 2024-01-08
Payer: COMMERCIAL

## 2024-01-08 VITALS
BODY MASS INDEX: 31.15 KG/M2 | TEMPERATURE: 98 F | OXYGEN SATURATION: 99 % | HEART RATE: 88 BPM | DIASTOLIC BLOOD PRESSURE: 68 MMHG | WEIGHT: 230 LBS | RESPIRATION RATE: 18 BRPM | HEIGHT: 72 IN | SYSTOLIC BLOOD PRESSURE: 130 MMHG

## 2024-01-08 DIAGNOSIS — J01.00 ACUTE NON-RECURRENT MAXILLARY SINUSITIS: Primary | ICD-10-CM

## 2024-01-08 RX ORDER — AMOXICILLIN AND CLAVULANATE POTASSIUM 875; 125 MG/1; MG/1
1 TABLET, FILM COATED ORAL 2 TIMES DAILY
Qty: 20 TABLET | Refills: 0 | Status: SHIPPED | OUTPATIENT
Start: 2024-01-08 | End: 2024-01-18

## 2024-01-08 NOTE — PATIENT INSTRUCTIONS
1. Rest. Drink plenty of fluids.   2. Supportive care as discussed.   3. Augmentin as prescribed.  4. Follow up with PMD in 4-5 days for re-eval. Go to the emergency department immediately if symptoms worsen, change, you develop chest discomfort, wheezing, shortness of breath, or if you have any concerns.

## 2024-01-08 NOTE — PROGRESS NOTES
CHIEF COMPLAINT:     Chief Complaint   Patient presents with    Sinus Problem     Sinus pressure - Entered by patient       HPI:   Roman Carrizales is a 43 year old male who presents for concerns of sinus infection. Patient reports sinus congestion/pressure and thick green nasal drainage for over a week now.  Denies any fevers,wheezing, chest discomfort, or shortness of breath. .  Treating symptoms with otc cold meds.   Tolerates PO well at home. No n/v/d.  Denies any other aggravating or relieving factors at home. Denies any other treatment attempts prior to arrival. Denies known covid-19 or strep exposure.       Current Outpatient Medications   Medication Sig Dispense Refill    amoxicillin clavulanate 875-125 MG Oral Tab Take 1 tablet by mouth 2 (two) times daily for 10 days. 20 tablet 0    Omeprazole 20 MG Oral Tab EC Take 40 mg by mouth nightly. 30 tablet 2      Past Medical History:   Diagnosis Date    Tobacco abuse       Past Surgical History:   Procedure Laterality Date    APPENDECTOMY      APPENDECTOMY      VASECTOMY  2020    Dr. Sorenson         Social History     Socioeconomic History    Marital status:    Tobacco Use    Smoking status: Former     Packs/day: 0.25     Years: 20.00     Additional pack years: 0.00     Total pack years: 5.00     Types: Cigarettes     Quit date: 2021     Years since quittin.4    Smokeless tobacco: Current     Types: Snuff   Vaping Use    Vaping Use: Never used   Substance and Sexual Activity    Alcohol use: Yes     Alcohol/week: 0.0 standard drinks of alcohol     Comment: moderate    Drug use: No         REVIEW OF SYSTEMS:   GENERAL: Denies fever.  Notes good appetite  SKIN: no rashes or abnormal skin lesions  HEENT: Denies sore throat, + sinus symptoms, Denies ear pain  LUNGS: + nonproductive cough, denies shortness of breath or wheezing,   CARDIOVASCULAR: denies chest pain or palpitations   GI: denies N/V/C or abdominal pain  NEURO: Denies headaches    EXAM:    /68   Pulse 88   Temp 98.2 °F (36.8 °C) (Temporal)   Resp 18   Ht 6' (1.829 m)   Wt 230 lb (104.3 kg)   SpO2 99%   BMI 31.19 kg/m²   GENERAL: well developed, well nourished,in no apparent distress  SKIN: no rashes,no suspicious lesions  HEAD: atraumatic, normocephalic.    EYES: conjunctiva clear  EARS: TM's intact and without erythema, no bulging, no retraction,no fluid, bony landmarks visualized. No erythema or swelling noted to ear canals or external ears.   NOSE: Nostrils patent, dried yellow nasal mucous, nasal mucosa reddened   THROAT: Oral mucosa pink, moist. Posterior pharynx is mildly erythematous. No exudates. No tonsillar hypertrophy noted.  No trismus. Uvula midline with no swelling. Voice clear/normal. No stridor  NECK: Supple, non-tender  LUNGS: clear to auscultation bilaterally, no rales, wheezes or rhonchi. Breathing is non labored.  CARDIO: RRR without murmur  EXTREMITIES: no cyanosis, clubbing or edema  LYMPH:  No lymphadenopathy.        ASSESSMENT AND PLAN:       ICD-10-CM    1. Acute non-recurrent maxillary sinusitis  J01.00 amoxicillin clavulanate 875-125 MG Oral Tab        Discussed physical exam and hpi with pt. Pt has reassuring physical exam consistent with sinusitis. We discussed viral vs allergy vs bacterial etiologies associated with sinusitis. Pt notes he would like to start abx today and declined delayed antimicrobial therapy option. Treatment options discussed with patient and explained in detail. Will start augmentin along with supportive care. The risks, benefits and potential side effects of possible medications were reviewed. Alternatives were discussed. Monitoring parameters and expected course outlined. Patient to call PCP or go to emergency department if symptoms fail to respond as outlined, or worsen in any way. The patient agreed with the plan.     Patient Instructions   1. Rest. Drink plenty of fluids.   2. Supportive care as discussed.   3. Augmentin as  prescribed.  4. Follow up with PMD in 4-5 days for re-eval. Go to the emergency department immediately if symptoms worsen, change, you develop chest discomfort, wheezing, shortness of breath, or if you have any concerns.

## 2024-09-27 ENCOUNTER — TELEPHONE (OUTPATIENT)
Dept: FAMILY MEDICINE CLINIC | Facility: CLINIC | Age: 44
End: 2024-09-27

## 2024-09-27 DIAGNOSIS — Z00.00 ROUTINE HEALTH MAINTENANCE: Primary | ICD-10-CM

## 2024-09-27 NOTE — TELEPHONE ENCOUNTER
Pt coming in for px on 10/17/24. Would like to have labs done ahead of time. Please place order for routine labs and notify pt.

## 2024-09-30 NOTE — TELEPHONE ENCOUNTER
Pt's wife has sent over requirements for wellness program form via her own My Chart- form lists which labs pt needs to have done: lipid panel and EITHER CMP or general health panel. Form has been placed in Dr. Garcias's inbox.

## 2024-10-12 ENCOUNTER — NURSE ONLY (OUTPATIENT)
Dept: FAMILY MEDICINE CLINIC | Facility: CLINIC | Age: 44
End: 2024-10-12
Payer: COMMERCIAL

## 2024-10-12 DIAGNOSIS — Z00.00 ROUTINE HEALTH MAINTENANCE: ICD-10-CM

## 2024-10-12 LAB
ALBUMIN SERPL-MCNC: 4.5 G/DL (ref 3.2–4.8)
ALBUMIN/GLOB SERPL: 1.8 {RATIO} (ref 1–2)
ALP LIVER SERPL-CCNC: 96 U/L
ALT SERPL-CCNC: 29 U/L
ANION GAP SERPL CALC-SCNC: 3 MMOL/L (ref 0–18)
AST SERPL-CCNC: 24 U/L (ref ?–34)
BASOPHILS # BLD AUTO: 0.04 X10(3) UL (ref 0–0.2)
BASOPHILS NFR BLD AUTO: 1 %
BILIRUB SERPL-MCNC: 0.3 MG/DL (ref 0.3–1.2)
BUN BLD-MCNC: 19 MG/DL (ref 9–23)
CALCIUM BLD-MCNC: 9.4 MG/DL (ref 8.7–10.4)
CHLORIDE SERPL-SCNC: 108 MMOL/L (ref 98–112)
CHOLEST SERPL-MCNC: 158 MG/DL (ref ?–200)
CO2 SERPL-SCNC: 28 MMOL/L (ref 21–32)
CREAT BLD-MCNC: 1.08 MG/DL
EGFRCR SERPLBLD CKD-EPI 2021: 88 ML/MIN/1.73M2 (ref 60–?)
EOSINOPHIL # BLD AUTO: 0.07 X10(3) UL (ref 0–0.7)
EOSINOPHIL NFR BLD AUTO: 1.7 %
ERYTHROCYTE [DISTWIDTH] IN BLOOD BY AUTOMATED COUNT: 12.1 %
FASTING PATIENT LIPID ANSWER: YES
FASTING STATUS PATIENT QL REPORTED: YES
GLOBULIN PLAS-MCNC: 2.5 G/DL (ref 2–3.5)
GLUCOSE BLD-MCNC: 103 MG/DL (ref 70–99)
HCT VFR BLD AUTO: 42.8 %
HDLC SERPL-MCNC: 54 MG/DL (ref 40–59)
HGB BLD-MCNC: 14.5 G/DL
IMM GRANULOCYTES # BLD AUTO: 0.01 X10(3) UL (ref 0–1)
IMM GRANULOCYTES NFR BLD: 0.2 %
LDLC SERPL CALC-MCNC: 92 MG/DL (ref ?–100)
LYMPHOCYTES # BLD AUTO: 1.47 X10(3) UL (ref 1–4)
LYMPHOCYTES NFR BLD AUTO: 36.4 %
MCH RBC QN AUTO: 30.6 PG (ref 26–34)
MCHC RBC AUTO-ENTMCNC: 33.9 G/DL (ref 31–37)
MCV RBC AUTO: 90.3 FL
MONOCYTES # BLD AUTO: 0.44 X10(3) UL (ref 0.1–1)
MONOCYTES NFR BLD AUTO: 10.9 %
NEUTROPHILS # BLD AUTO: 2.01 X10 (3) UL (ref 1.5–7.7)
NEUTROPHILS # BLD AUTO: 2.01 X10(3) UL (ref 1.5–7.7)
NEUTROPHILS NFR BLD AUTO: 49.8 %
NONHDLC SERPL-MCNC: 104 MG/DL (ref ?–130)
OSMOLALITY SERPL CALC.SUM OF ELEC: 291 MOSM/KG (ref 275–295)
PLATELET # BLD AUTO: 240 10(3)UL (ref 150–450)
POTASSIUM SERPL-SCNC: 4.7 MMOL/L (ref 3.5–5.1)
PROT SERPL-MCNC: 7 G/DL (ref 5.7–8.2)
RBC # BLD AUTO: 4.74 X10(6)UL
SODIUM SERPL-SCNC: 139 MMOL/L (ref 136–145)
T4 FREE SERPL-MCNC: 1.2 NG/DL (ref 0.8–1.7)
TRIGL SERPL-MCNC: 60 MG/DL (ref 30–149)
TSI SER-ACNC: 1.68 MIU/ML (ref 0.55–4.78)
VLDLC SERPL CALC-MCNC: 10 MG/DL (ref 0–30)
WBC # BLD AUTO: 4 X10(3) UL (ref 4–11)

## 2024-10-12 PROCEDURE — 84443 ASSAY THYROID STIM HORMONE: CPT | Performed by: FAMILY MEDICINE

## 2024-10-12 PROCEDURE — 80053 COMPREHEN METABOLIC PANEL: CPT | Performed by: FAMILY MEDICINE

## 2024-10-12 PROCEDURE — 85025 COMPLETE CBC W/AUTO DIFF WBC: CPT | Performed by: FAMILY MEDICINE

## 2024-10-12 PROCEDURE — 80061 LIPID PANEL: CPT | Performed by: FAMILY MEDICINE

## 2024-10-12 PROCEDURE — 84439 ASSAY OF FREE THYROXINE: CPT | Performed by: FAMILY MEDICINE

## 2024-10-12 NOTE — PROGRESS NOTES
Here for nurse visit blood draw    1 lavender and 1 mint tube drawn from right arm with butterfly needle    Patient tolerated well and left office in stable condition

## 2024-10-17 ENCOUNTER — OFFICE VISIT (OUTPATIENT)
Dept: FAMILY MEDICINE CLINIC | Facility: CLINIC | Age: 44
End: 2024-10-17
Payer: COMMERCIAL

## 2024-10-17 VITALS
DIASTOLIC BLOOD PRESSURE: 64 MMHG | RESPIRATION RATE: 16 BRPM | SYSTOLIC BLOOD PRESSURE: 116 MMHG | WEIGHT: 229.38 LBS | OXYGEN SATURATION: 98 % | BODY MASS INDEX: 31.41 KG/M2 | HEIGHT: 71.5 IN | HEART RATE: 103 BPM | TEMPERATURE: 98 F

## 2024-10-17 DIAGNOSIS — Z00.00 ROUTINE HEALTH MAINTENANCE: Primary | ICD-10-CM

## 2024-10-17 NOTE — PROGRESS NOTES
Roman Carrizales is a 44 year old male who presents for a complete physical exam.   HPI:   Pt complains of nothing at this time he recently had labs done and overall they looked good. He has no concerns, is otherwise feeling well. And has no concerns     Immunization History   Administered Date(s) Administered    6-35 MON FLUZONE QUAD PRESERVE FREE SINGLE DOSE (61872) FLU [facility] 11/30/2015    Covid-19 Vaccine Pfizer 30 mcg/0.3 ml 05/02/2021, 05/23/2021    TDAP 09/17/2013     Wt Readings from Last 6 Encounters:   10/17/24 229 lb 6 oz (104 kg)   01/08/24 230 lb (104.3 kg)   09/16/23 234 lb (106.1 kg)   12/14/21 224 lb 12.8 oz (102 kg)   05/27/21 209 lb (94.8 kg)   05/27/21 209 lb 9.6 oz (95.1 kg)     Body mass index is 31.55 kg/m².     Lab Results   Component Value Date     (H) 10/12/2024    GLU 96 09/16/2023     (H) 12/14/2021     Lab Results   Component Value Date    CHOLEST 158 10/12/2024    CHOLEST 150 09/16/2023    CHOLEST 177 12/14/2021     Lab Results   Component Value Date    HDL 54 10/12/2024    HDL 51 09/16/2023    HDL 63 (H) 12/14/2021     Lab Results   Component Value Date    LDL 92 10/12/2024    LDL 89 09/16/2023    LDL 98 12/14/2021     Lab Results   Component Value Date    AST 24 10/12/2024    AST 19 09/16/2023    AST 26 12/14/2021     Lab Results   Component Value Date    ALT 29 10/12/2024    ALT 42 09/16/2023    ALT 48 12/14/2021     No results found for: \"PSA\"     Current Outpatient Medications   Medication Sig Dispense Refill    Omeprazole 20 MG Oral Tab EC Take 40 mg by mouth nightly. 30 tablet 2      Past Medical History:    Tobacco abuse      Past Surgical History:   Procedure Laterality Date    Appendectomy      Appendectomy      Vasectomy  03/12/2020    Dr. Sorenson      Family History   Problem Relation Age of Onset    Other (Other) Brother         dermatitis      Social History:  Social History     Socioeconomic History    Marital status:    Tobacco Use    Smoking status:  Former     Current packs/day: 0.00     Average packs/day: 0.3 packs/day for 20.0 years (5.0 ttl pk-yrs)     Types: Cigarettes     Start date: 7/30/2001     Quit date: 7/30/2021     Years since quitting: 3.2    Smokeless tobacco: Current     Types: Snuff   Vaping Use    Vaping status: Never Used   Substance and Sexual Activity    Alcohol use: Yes     Alcohol/week: 0.0 standard drinks of alcohol     Comment: moderate    Drug use: No     Social Drivers of Health      Received from [facility], [facility]    Social Connections    Received from [facility], [facility]    Housing Stability      Occ: . : . Children: 3.   Exercise: minimal.  Diet: doesn't watch     REVIEW OF SYSTEMS:   GENERAL: feels well otherwise  SKIN: denies any unusual skin lesions  EYES:denies blurred vision or double vision  HEENT: denies nasal congestion, sinus pain or ST  LUNGS: denies shortness of breath with exertion  CARDIOVASCULAR: denies chest pain on exertion  GI: denies abdominal pain,denies heartburn  : denies nocturia or changes in stream  MUSCULOSKELETAL: denies back pain  NEURO: denies headaches  PSYCHE: denies depression or anxiety  HEMATOLOGIC: denies hx of anemia  ENDOCRINE: denies thyroid history  ALL/ASTHMA: denies hx of allergy or asthma    EXAM:   /64   Pulse 103   Temp 97.8 °F (36.6 °C) (Temporal)   Resp 16   Ht 5' 11.5\" (1.816 m)   Wt 229 lb 6 oz (104 kg)   SpO2 98%   BMI 31.55 kg/m²   Body mass index is 31.55 kg/m².   GENERAL: well developed, well nourished,in no apparent distress  SKIN: no rashes,no suspicious lesions  HEENT: atraumatic, normocephalic,ears and throat are clear  EYES:PERRLA, EOMI, normal optic disk,conjunctiva are clear  NECK: supple,no adenopathy,no bruits  CHEST: no chest tenderness  LUNGS: clear to auscultation  CARDIO: RRR without murmur  GI: good BS's,no masses, HSM or tenderness  : two  descended testes,no masses,no hernia,no penile lesions  MUSCULOSKELETAL: back is not tender,FROM of the back  EXTREMITIES: no cyanosis, clubbing or edema  NEURO: Oriented times three,cranial nerves are intact,motor and sensory are grossly intact    ASSESSMENT AND PLAN:   Roman Carrizales is a 44 year old male who presents for a complete physical exam.  Pt's weight is Body mass index is 31.55 kg/m²., recommended low fat diet and aerobic exercise 30 minutes three times weekly. Reveiwed  Lipids, CMP, CBC and TSH Pt info handouts given for: exercise, low fat diet, testicular self exam and prostate cancer screening. The patient indicates understanding of these issues and agrees to the plan.  The patient is asked to return for CPX in 1 year.  Encounter Diagnosis   Name Primary?    Routine health maintenance Yes       Orders Placed This Encounter   Procedures    TdaP (Adacel, Boostrix) [43285]       Meds & Refills for this Visit:  Requested Prescriptions      No prescriptions requested or ordered in this encounter       Imaging & Consults:  TETANUS, DIPHTHERIA TOXOIDS AND ACELLULAR PERTUSIS VACCINE (TDAP), >7 YEARS, IM USE

## 2025-02-20 ENCOUNTER — OCC HEALTH (OUTPATIENT)
Dept: OCCUPATIONAL MEDICINE | Age: 45
End: 2025-02-20

## 2025-02-20 VITALS
HEIGHT: 72 IN | BODY MASS INDEX: 29.93 KG/M2 | WEIGHT: 221 LBS | SYSTOLIC BLOOD PRESSURE: 129 MMHG | DIASTOLIC BLOOD PRESSURE: 78 MMHG | HEART RATE: 80 BPM

## 2025-02-20 DIAGNOSIS — Z02.89 VISIT FOR OCCUPATIONAL HEALTH EXAMINATION: Primary | ICD-10-CM

## 2025-02-20 LAB
APPEARANCE UR: CLEAR
BILIRUB UR QL STRIP: NEGATIVE MG/DL
COLOR UR: YELLOW
GLUCOSE UR-SCNC: NEGATIVE MMOL/L
KETONES UR QL STRIP: NEGATIVE MMOL/L
NITRITE UR STRIP-MCNC: NEGATIVE MG/DL
PH UR: 7 [PH]
PROT UR STRIP-MCNC: NEGATIVE G/L
RBC # UR STRIP: NEGATIVE ERY/UL
SP GR UR: 1.01
UROBILINOGEN UR-MCNC: 0.2 MG/DL
WBC # UR: NEGATIVE LEU/UL

## 2025-02-20 PROCEDURE — OH021 PRE PLACEMENT PHYSICAL EXAM: Performed by: PHYSICIAN ASSISTANT

## 2025-02-20 PROCEDURE — OH023 DOT/CDL PHYSICAL EXAM: Performed by: PHYSICIAN ASSISTANT

## 2025-02-20 PROCEDURE — OH035 DOT/N-DOT DS COLLECTION ONLY (ALL TYPES): Performed by: PHYSICIAN ASSISTANT

## 2025-02-20 PROCEDURE — OH143 RAPID TEST DRUG KIT & COLLECTION 10 PANEL: Performed by: PHYSICIAN ASSISTANT

## (undated) NOTE — MR AVS SNAPSHOT
[address] [facility] [address]  [phone]               Thank you for choosing us for your health care visit with Chela Box DO.   We are glad to serve you and happy to provide you with this sum Support Staff. Remember, [employer]wave is NOT to be used for urgent needs. For medical emergencies, dial 911. Visit [email or web address] to learn more.            Visit [facility] online at  [email or web address]

## (undated) NOTE — MR AVS SNAPSHOT
[address] [facility] [address]  [phone]               Thank you for choosing us for your health care visit with Adama Aguirre, .   We are glad to serve you and happy to provide you with this sum Visit [email or web address] to learn more. Educational Information     Healthy Diet and Regular Exercise  [employer] for making healthy food choices  -   Enjoy your food, but eat less.   Fully enjoy your food when

## (undated) NOTE — MR AVS SNAPSHOT
[address] [facility] [address] [phone]               Thank you for choosing us for your health care visit with Noam Garza DO.   We are glad to serve you and happy to provide you with this sum Visit [facility] online at  [email or web address]

## (undated) NOTE — LETTER
Patient Name: Karine Blue  : 1980  MRN: [id number]  Patient Address: [facility] [address]      Coronavirus Disease 2019 (COVID-19)     [place] [address] is committed to the safety and well-being of our patients, members, carefully. If your symptoms get worse, call your healthcare provider immediately. 3. Get rest and stay hydrated.    4. If you have a medical appointment, call the healthcare provider ahead of time and tell them that you have or may have COVID-19.  5. For m of fever-reducing medications; and  · Improvement in respiratory symptoms (e.g., cough, shortness of breath); and  · At least 10 days have passed since symptoms first appeared OR if asymptomatic patient or date of symptom onset is unclear then use 10 days donors must:    · Have had a confirmed diagnosis of COVID-19  · Be symptom-free for at least 14 days*    *Some people will be required to have a repeat COVID-19 test in order to be eligible to donate.  If you’re instructed by Roni that a repeat test is r